# Patient Record
Sex: FEMALE | Race: WHITE
[De-identification: names, ages, dates, MRNs, and addresses within clinical notes are randomized per-mention and may not be internally consistent; named-entity substitution may affect disease eponyms.]

---

## 2017-05-24 ENCOUNTER — HOSPITAL ENCOUNTER (EMERGENCY)
Dept: HOSPITAL 10 - FTE | Age: 32
LOS: 1 days | Discharge: HOME | End: 2017-05-25
Payer: MEDICAID

## 2017-05-24 VITALS
WEIGHT: 149.91 LBS | WEIGHT: 149.91 LBS | HEIGHT: 63 IN | BODY MASS INDEX: 26.56 KG/M2 | BODY MASS INDEX: 26.56 KG/M2 | HEIGHT: 63 IN

## 2017-05-24 DIAGNOSIS — J20.9: ICD-10-CM

## 2017-05-24 DIAGNOSIS — R05: Primary | ICD-10-CM

## 2017-05-24 LAB
ADD SCAN DIFF: NO
ANION GAP SERPL CALC-SCNC: 10 MMOL/L (ref 8–16)
BASOPHILS # BLD AUTO: 0 10^3/UL (ref 0–0.1)
BASOPHILS NFR BLD: 0.5 % (ref 0–2)
BUN SERPL-MCNC: 20 MG/DL (ref 7–20)
CALCIUM SERPL-MCNC: 9.5 MG/DL (ref 8.4–10.2)
CHLORIDE SERPL-SCNC: 103 MMOL/L (ref 97–110)
CO2 SERPL-SCNC: 28 MMOL/L (ref 21–31)
CREAT SERPL-MCNC: 0.6 MG/DL (ref 0.44–1)
EOSINOPHIL # BLD: 0.3 10^3/UL (ref 0–0.5)
EOSINOPHIL NFR BLD: 3.1 % (ref 0–7)
ERYTHROCYTE [DISTWIDTH] IN BLOOD BY AUTOMATED COUNT: 12.4 % (ref 11.5–14.5)
GLUCOSE SERPL-MCNC: 86 MG/DL (ref 70–220)
HCT VFR BLD CALC: 36.2 % (ref 37–47)
HGB BLD-MCNC: 11.9 G/DL (ref 12–16)
LYMPHOCYTES # BLD AUTO: 3 10^3/UL (ref 0.8–2.9)
LYMPHOCYTES NFR BLD AUTO: 34.6 % (ref 15–51)
MCH RBC QN AUTO: 30.2 PG (ref 29–33)
MCHC RBC AUTO-ENTMCNC: 32.9 G/DL (ref 32–37)
MCV RBC AUTO: 91.9 FL (ref 82–101)
MONOCYTES # BLD: 0.5 10^3/UL (ref 0.3–0.9)
MONOCYTES NFR BLD: 5.8 % (ref 0–11)
NEUTROPHILS # BLD: 4.9 10^3/UL (ref 1.6–7.5)
NEUTROPHILS NFR BLD AUTO: 55.8 % (ref 39–77)
NRBC # BLD MANUAL: 0 10^3/UL (ref 0–0)
NRBC BLD QL: 0 /100WBC (ref 0–0)
PLATELET # BLD: 250 10^3/UL (ref 140–415)
PMV BLD AUTO: 9.7 FL (ref 7.4–10.4)
POTASSIUM SERPL-SCNC: 3.5 MMOL/L (ref 3.5–5.1)
RBC # BLD AUTO: 3.94 10^6/UL (ref 4.2–5.4)
SODIUM SERPL-SCNC: 137 MMOL/L (ref 135–144)
WBC # BLD AUTO: 8.8 10^3/UL (ref 4.8–10.8)

## 2017-05-24 PROCEDURE — 85025 COMPLETE CBC W/AUTO DIFF WBC: CPT

## 2017-05-24 PROCEDURE — 71250 CT THORAX DX C-: CPT

## 2017-05-24 PROCEDURE — 80048 BASIC METABOLIC PNL TOTAL CA: CPT

## 2017-05-24 NOTE — ERA
ER Documentation


Chief Complaint


Date/Time


DATE: 5/24/17 


TIME: 22:16


Chief Complaint


cough x 1 week with back pain, TB test+, x ray chest -clear in the clinic





HPI


This is a 29-year-old female presents with a chief complaint of cough.  Patient 

has a cough 1 week that is worse in the evening and nighttime.  Patient has a 

positive history of a positive PPD 2 months ago with a negative chest x-ray 1 

month ago.  Patient also complains of night sweats and mild unintentional 

weight loss.  Patient works as a  for regular household.  Patient 

denies any sick contacts.  Patient denies hemoptysis, dysphasia, edema/

angioedema, excessive weight loss, difficulty breathings, asthma, high fever, 

meningismus, pharyngitis, change in voice, drooling, chest pain, or rigors.  

Patient has no other complaints at this time.





ROS


All systems reviewed and are negative except as per history of present illness.





Medications


Home Meds


Reported Medications


Multivit/Min/Fol Ac/Iron/Pren* (Prenatal S*) 1 Tab Tab, 1 TAB PO AM, TAB


   3/20/15





Allergies


Allergies:  


Coded Allergies:  


     No Known Allergy (Unverified , 2/16/15)





PMhx/Soc


History of Surgery:  Yes (tubal ligation)


Anesthesia Reaction:  No


Hx Neurological Disorder:  No


Hx Respiratory Disorders:  No


Hx Cardiac Disorders:  No


Hx Psychiatric Problems:  No


Hx Miscellaneous Medical Probl:  No


Hx Alcohol Use:  No


Hx Substance Use:  No


Hx Tobacco Use:  No


Smoking Status:  Never smoker





Physical Exam


Vitals





Vital Signs








  Date Time  Temp Pulse Resp B/P Pulse Ox O2 Delivery O2 Flow Rate FiO2


 


5/24/17 21:00 98.4 99 20 118/61 98   








Physical Exam


Const:  []


Head:   Atraumatic 


Eyes:    Normal Conjunctiva


ENT:    Normal External Ears, Nose and Mouth.


Neck:               Full range of motion..~ No meningismus.


Resp:    Clear to auscultation bilaterally


Cardio:    Regular rate and rhythm, no murmurs


Abd:    Soft, non tender, non distended. Normal bowel sounds


Skin:    No petechiae or rashes


Back:    No midline or flank tenderness


Ext:    No cyanosis, or edema


Neur:    Awake and alert


Psych:    Normal Mood and Affect


Result Diagram:  


5/24/17 2250 5/24/17 2250





Results 24 hrs








 Laboratory Tests








Test


  5/24/17


22:50


 


White Blood Count 8.810^3/ul 


 


Red Blood Count 3.9410^6/ul 


 


Hemoglobin 11.9g/dl 


 


Hematocrit 36.2% 


 


Mean Corpuscular Volume 91.9fl 


 


Mean Corpuscular Hemoglobin 30.2pg 


 


Mean Corpuscular Hemoglobin


Concent 32.9g/dl 


 


 


Red Cell Distribution Width 12.4% 


 


Platelet Count 09402^3/UL 


 


Mean Platelet Volume 9.7fl 


 


Neutrophils % 55.8% 


 


Lymphocytes % 34.6% 


 


Monocytes % 5.8% 


 


Eosinophils % 3.1% 


 


Basophils % 0.5% 


 


Nucleated Red Blood Cells % 0.0/100WBC 


 


Neutrophils # 4.910^3/ul 


 


Lymphocytes # 3.010^3/ul 


 


Monocytes # 0.510^3/ul 


 


Eosinophils # 0.310^3/ul 


 


Basophils # 0.010^3/ul 


 


Nucleated Red Blood Cells # 0.010^3/ul 


 


Sodium Level 137mmol/L 


 


Potassium Level 3.5mmol/L 


 


Chloride Level 103mmol/L 


 


Carbon Dioxide Level 28mmol/L 


 


Anion Gap 10 


 


Blood Urea Nitrogen 20mg/dl 


 


Creatinine 0.60mg/dl 


 


Glucose Level 86mg/dl 


 


Calcium Level 9.5mg/dl 














Procedures/MDM


Patient is being worked up and evaluated for cough.


Patient had a positive PPD 2 months ago.  Spoke to my attending and he 

suggested since there is a negative chest x-ray 1 month ago to go ahead and 

get a CT of the chest.  Tuberculosis precautions are in place.


Patient CT showed no evidence of tuberculosis.  Findings included the following:


Impression - Nonspecific multiple bilateral sub centimeter lung nodules the 

largest 7 x 3 mm in right lower lobe. These are thought to most likely be 

inflammatory in this 31 year old patient. Please see above.


Most likely diagnosis at this time is acute bronchitis of viral etiology.  We 

will go ahead and give the patient symptomatic treatment with 3 days of 

Tessalon Perls.  We will be discharging with discharge instructions with return 

precautions.  Vital signs are stable at this time and patient's current 

condition is appropriate for discharge.





Departure


Diagnosis:  


 Primary Impression:  


 Cough


 Additional Impression:  


 Acute bronchitis


 Qualified Code:  J20.9 - Acute bronchitis, unspecified organism


Condition:  Stable





Additional Instructions:  


Follow up with your PCP within the next 1-3 days for a more thorough evaluation 

and a possible referral to a specialist. Return the the emergency department 

immediately if symptoms worsen or change. If you have any questions regarding 

medications, ask your pharmacist or us before you leave. If any adverse 

reactions occur while taking your medications, discontinue the treatment and 

return to the emergency department immediately.  Take your medications as 

directed, and complete the entire course of treatment.











MAXINE YOUNG PA-C May 24, 2017 22:19

## 2017-05-25 VITALS
TEMPERATURE: 97.8 F | RESPIRATION RATE: 16 BRPM | DIASTOLIC BLOOD PRESSURE: 60 MMHG | SYSTOLIC BLOOD PRESSURE: 94 MMHG | HEART RATE: 81 BPM

## 2017-05-25 NOTE — RADRPT
PROCEDURE:   CT Chest without contrast. 

 

CLINICAL INDICATION: Cough

 

TECHNIQUE:   CT scan of the chest without contrast was performed on a multidetector high-resolution 
CT scanner.  Coronal and sagittal reformatted images were obtained from the axial source images. The
 total exam CTDI equals 10.98 mGy and the total exam DLP equals 396.95 mGy-cm.

 

One or more the following dose reduction techniques were utilized: Automated exposure control, adjus
tment of the mA/ or kV according to patient's size, or use of iterative reconstruction technique.

 

COMPARISON:   None available 

 

FINDINGS:

 

Minimal dependent atelectasis in posterior lungs. Scattered linear atelectasis/fibrosis. No pneumoth
orax or pleural effusion is seen. Multiple bilateral sub centimeter lung nodules are seen the larges
t 7 x 3 mm in the right lower lobe.  There is no focal pulmonary consolidation.  The central tracheo
bronchial tree is clear. Small calcified granuloma in the left lower lung lobe.

 

The mediastinum is unremarkable without evidence for mass or lymphadenopathy.  There is appearance o
f a small amount of residual thymic tissue in the anterior mediastinum.  The vascular structures of 
the mediastinum are normal in course and caliber.    The heart size is normal without evidence for p
ericardial thickening or effusion.

 

The axillary regions, subpectoral regions, and supraclavicular regions are all unremarkable.  The ontiveros
rrounding chest wall is unremarkable.  Imaging obtained through the upper abdomen reveals no acute a
bnormality. Likely small bone island in left humeral head.

 

IMPRESSION:

Nonspecific multiple bilateral sub centimeter lung nodules the largest 7 x 3 mm in right lower lobe.
 These are thought to most likely be inflammatory in this 31 year old patient. Please see above.

 

 

RPTAT: HJES

_____________________________________________ 

.Jethro Cheney MD, MD           Date    Time 

Electronically viewed and signed by .Jethro Cheney MD, MD on 05/25/2017 01:53 

 

D:  05/25/2017 01:53  T:  05/25/2017 01:53

.S/

## 2018-01-16 ENCOUNTER — HOSPITAL ENCOUNTER (EMERGENCY)
Dept: HOSPITAL 91 - FTE | Age: 33
LOS: 1 days | Discharge: LEFT BEFORE DISCHARGE | End: 2018-01-17
Payer: SELF-PAY

## 2018-01-16 ENCOUNTER — HOSPITAL ENCOUNTER (EMERGENCY)
Age: 33
LOS: 1 days | Discharge: LEFT BEFORE DISCHARGE | End: 2018-01-17

## 2018-01-16 DIAGNOSIS — Z53.21: Primary | ICD-10-CM
